# Patient Record
Sex: MALE | Race: OTHER
[De-identification: names, ages, dates, MRNs, and addresses within clinical notes are randomized per-mention and may not be internally consistent; named-entity substitution may affect disease eponyms.]

---

## 2018-04-06 ENCOUNTER — HOSPITAL ENCOUNTER (EMERGENCY)
Dept: HOSPITAL 89 - ER | Age: 23
Discharge: HOME | End: 2018-04-06
Payer: COMMERCIAL

## 2018-04-06 VITALS — DIASTOLIC BLOOD PRESSURE: 68 MMHG | SYSTOLIC BLOOD PRESSURE: 111 MMHG

## 2018-04-06 DIAGNOSIS — M54.5: Primary | ICD-10-CM

## 2018-04-06 PROCEDURE — 99283 EMERGENCY DEPT VISIT LOW MDM: CPT

## 2018-04-06 PROCEDURE — 81001 URINALYSIS AUTO W/SCOPE: CPT

## 2018-04-06 PROCEDURE — 87088 URINE BACTERIA CULTURE: CPT

## 2018-04-06 PROCEDURE — 72120 X-RAY BEND ONLY L-S SPINE: CPT

## 2018-04-06 NOTE — ER REPORT
History and Physical


Time Seen By MD:  16:47


Hx. of Stated Complaint:  


pt presents with back pain for 1 month. Thought it would be gone by now.  Does 


not recall injuring himself, does carry a heavy back pack, RECENT LONG CAR TRIP


HPI/ROS


CHIEF COMPLAINT: Back pain





HISTORY OF PRESENT ILLNESS: 23-year-old male patient presents to the emergency 

room with complaint of back pain. Patient states back pains been going on for 

the past month. He states that he does have to carry heavy books round his 

backpack as he is student. He also states that he was on a long car ride, 12 

hours, prior to this starting. He states that there is some positions he can 

make the pain worse. He states that currently his pain as a 2 out of 10. He 

denies any saddle paresthesia. He denies any loss of bowel or bladder control. 

He states there is nothing seems like the pain better or worse. He states that 

he is not taking any medication for this.





REVIEW OF SYSTEMS:


Respiratory: No cough, no dyspnea.


Cardiovascular: No chest pain, no palpitations.


Gastrointestinal: No vomiting, no abdominal pain.


Musculoskeletal: As noted above


Allergies:  


Coded Allergies:  


     No Known Drug Allergies (Unverified , 4/6/18)


Home Meds


Active Scripts


Diclofenac Sodium (DICLOFENAC SODIUM) 75 Mg Tablet., 75 MG PO BID, #20 TAB


   Prov:HENRY ALDANA         4/6/18


Past Medical/Surgical History


Patient denies any pertinent medical or surgical history.


Reviewed Nurses Notes:  Yes


Constitutional





Vital Sign - Last 24 Hours








 4/6/18 4/6/18 4/6/18 4/6/18





 16:30 16:39 16:41 16:47


 


Temp  98.0  


 


Pulse ??? 122  


 


Resp  16  


 


B/P (MAP)  90/81 90/81 (84) 103/80 (88)


 


Pulse Ox  97  


 


O2 Delivery  Room Air  


 


    





 4/6/18 4/6/18 4/6/18 





 17:00 17:30 18:00 


 


Pulse 97 89 82 


 


B/P (MAP) 109/69 (82) 93/66 (75) 111/68 (82) 


 


Pulse Ox 98 96 98 








Physical Exam


  General Appearance: The patient is alert, has no immediate need for airway 

protection and no current signs of toxicity.


ENT: Tympanic membranes are pearly-gray, auditory canals are patent, mucous 

membranes are moist.


Respiratory: Chest is non tender, lungs are clear to auscultation.


Cardiac: regular rate and rhythm


Gastrointestinal: Abdomen is soft and non tender, no masses, bowel sounds 

normal.


Musculoskeletal:  Neck: Neck is supple and non tender.


Back: Patient has no tenderness, there is no deformity.


   Extremities have full range of motion and are non tender.


Skin: No rashes or lesions.





DIFFERENTIAL DIAGNOSIS: After history and physical exam differential diagnosis 

was considered for back pain including but not limited to muscular pain, 

herniated disc, spine fracture, intra-abdominal causes and urinary tract 

infection.





Medical Decision Making


Data Points


Laboratory





Hematology








Test


  4/6/18


17:18


 


Urine Color Bere 


 


Urine Clarity Clear 


 


Urine pH


  5.0 pH


(4.8-9.5)


 


Urine Specific Gravity 1.027 


 


Urine Protein


  Negative mg/dL


(NEGATIVE)


 


Urine Glucose (UA)


  Negative mg/dL


(NEGATIVE)


 


Urine Ketones


  Trace mg/dL


(NEGATIVE)


 


Urine Blood


  Negative


(NEGATIVE)


 


Urine Nitrite


  Negative


(NEGATIVE)


 


Urine Bilirubin


  Negative


(NEGATIVE)


 


Urine Urobilinogen


  4.0 mg/dL


(0.2-1.9)


 


Urine Leukocyte Esterase


  Negative


(NEGATIVE)


 


Urine RBC


  1 /HPF


(0-2/HPF)


 


Urine WBC


  3 /HPF


(0-5/HPF)


 


Urine Squamous Epithelial


Cells None /LPF


(</=FEW)


 


Urine Bacteria


  Negative /HPF


(NONE-FEW)


 


Urine Mucus


  Few /HPF


(NONE-FEW)








Chemistry








Test


  4/6/18


17:18


 


Urine Color Bere 


 


Urine Clarity Clear 


 


Urine pH


  5.0 pH


(4.8-9.5)


 


Urine Specific Gravity 1.027 


 


Urine Protein


  Negative mg/dL


(NEGATIVE)


 


Urine Glucose (UA)


  Negative mg/dL


(NEGATIVE)


 


Urine Ketones


  Trace mg/dL


(NEGATIVE)


 


Urine Blood


  Negative


(NEGATIVE)


 


Urine Nitrite


  Negative


(NEGATIVE)


 


Urine Bilirubin


  Negative


(NEGATIVE)


 


Urine Urobilinogen


  4.0 mg/dL


(0.2-1.9)


 


Urine Leukocyte Esterase


  Negative


(NEGATIVE)


 


Urine RBC


  1 /HPF


(0-2/HPF)


 


Urine WBC


  3 /HPF


(0-5/HPF)


 


Urine Squamous Epithelial


Cells None /LPF


(</=FEW)


 


Urine Bacteria


  Negative /HPF


(NONE-FEW)


 


Urine Mucus


  Few /HPF


(NONE-FEW)








Urinalysis








Test


  4/6/18


17:18


 


Urine Color Bere 


 


Urine Clarity Clear 


 


Urine pH


  5.0 pH


(4.8-9.5)


 


Urine Specific Gravity 1.027 


 


Urine Protein


  Negative mg/dL


(NEGATIVE)


 


Urine Glucose (UA)


  Negative mg/dL


(NEGATIVE)


 


Urine Ketones


  Trace mg/dL


(NEGATIVE)


 


Urine Blood


  Negative


(NEGATIVE)


 


Urine Nitrite


  Negative


(NEGATIVE)


 


Urine Bilirubin


  Negative


(NEGATIVE)


 


Urine Urobilinogen


  4.0 mg/dL


(0.2-1.9)


 


Urine Leukocyte Esterase


  Negative


(NEGATIVE)


 


Urine RBC


  1 /HPF


(0-2/HPF)


 


Urine WBC


  3 /HPF


(0-5/HPF)


 


Urine Squamous Epithelial


Cells None /LPF


(</=FEW)


 


Urine Bacteria


  Negative /HPF


(NONE-FEW)


 


Urine Mucus


  Few /HPF


(NONE-FEW)











EKG/Imaging


Imaging


Examination: LUMBAR SPINE 4 VIEWS


 


Comparison: None.


 


History: Back pain for 3 weeks. No known injury.


 


Findings: 5 lumbar type vertebral bodies. Vertebral body height and alignment 

is within normal limits. Thoracolumbar, lumbosacral, facet, and sacroiliac 

alignment is preserved. No pars defect is identified. Disc spaces are 

unremarkable. Visualized soft tissues are unremarkable.


 


IMPRESSION:


 


Negative lumbar spine.


 


Report Dictated By: Juan A Alvarenga MD at 4/6/2018 5:50 PM


 


Report E-Signed By: Juan A Alvarenga MD  at 4/6/2018 5:52 PM





ED Course/Re-evaluation


ED Course


Patient was admitted to examine, history and physical were obtained. 

Differential diagnoses were considered. On examination patient has no obvious 

tenderness to the lumbar spine. He is moving without any signs of pain or 

discomfort. A urinalysis, x-ray of the lumbar spine was done. X-rays were 

negative. The urinalysis showed 3 white blood cells. As a result of that we 

will go ahead and culture and treat based on culture. I believe this was likely 

dirty sample the culture will show contamination. I discussed findings with 

patient. We will go ahead and treat him with anti-inflammatory. He is to take 

that twice a day for the next 10 days. He is to limit his heavy lifting, he is 

to limit activity by pain. He is return to emergency room if condition worsens. 

I would like and follow-up primary care in the next week. Patient verbalized 

understanding and agreement.


Decision to Disposition Date:  Apr 6, 2018


Decision to Disposition Time:  18:07





Depart


Departure


Latest Vital Signs





Vital Signs








  Date Time  Temp Pulse Resp B/P (MAP) Pulse Ox O2 Delivery O2 Flow Rate FiO2


 


4/6/18 18:00  82  111/68 (82) 98   


 


4/6/18 16:39 98.0  16   Room Air  








Impression:  


 Primary Impression:  


 Back pain


Condition:  Improved


Disposition:  HOME OR SELF-CARE


New Scripts


Diclofenac Sodium (DICLOFENAC SODIUM) 75 Mg Tablet.


75 MG PO BID, #20 TAB


   Prov: HENRY ALDANA         4/6/18


Patient Instructions:  Acute Low Back Pain (ED)





Additional Instructions:  


Limit activity by pain.


No heavy lifting.


Return to the ER if condition worsens.


Follow up with [facility] in the next week.


Apply heat to the back.


You can have labs done at [place] [id number] [facility]


[phone]





Problem Qualifiers








 Primary Impression:  


 Back pain


 Back pain location:  low back pain  Chronicity:  acute  Back pain laterality:  

midline  Sciatica presence:  without sciatica  Qualified Codes:  M54.5 - Low 

back pain








HENRY ALDANA Apr 6, 2018 16:47

## 2018-04-06 NOTE — RADIOLOGY IMAGING REPORT
FACILITY: [facility] 

 

PATIENT NAME: Hussain Navarrete

: 1995

MR: [id number]

V: [id number]

EXAM DATE: 

ORDERING PHYSICIAN: HENRY ALDANA

TECHNOLOGIST: 

 

Location: [facility] [place]

Patient: Hussain Navarrete

: 1995

MRN: [id number]

Visit/Account:[id number]

Date of Sevice:  2018

 

ACCESSION #: [id number]

 

Examination: LUMBAR SPINE 4 VIEWS

 

Comparison: None.

 

History: Back pain for 3 weeks. No known injury.

 

Findings: 5 lumbar type vertebral bodies. Vertebral body height and alignment is within normal limits
. Thoracolumbar, lumbosacral, facet, and sacroiliac alignment is preserved. No pars defect is identif
ied. Disc spaces are unremarkable. Visualized soft tissues are unremarkable.

 

IMPRESSION:

 

Negative lumbar spine.

 

Report Dictated By: Juan A Alvarenga MD at 2018 5:50 PM

 

Report E-Signed By: Juan A Alvarenga MD  at 2018 5:52 PM

 

WSN:[id number]